# Patient Record
Sex: MALE | Race: ASIAN | Employment: OTHER | URBAN - METROPOLITAN AREA
[De-identification: names, ages, dates, MRNs, and addresses within clinical notes are randomized per-mention and may not be internally consistent; named-entity substitution may affect disease eponyms.]

---

## 2017-04-17 PROBLEM — H91.13 PRESBYCUSIS OF BOTH EARS: Status: ACTIVE | Noted: 2017-04-17

## 2017-04-17 PROBLEM — K58.1 IRRITABLE BOWEL SYNDROME WITH CONSTIPATION: Status: ACTIVE | Noted: 2017-04-17

## 2017-05-30 PROBLEM — M18.11 PRIMARY OSTEOARTHRITIS OF FIRST CARPOMETACARPAL JOINT OF RIGHT HAND: Status: ACTIVE | Noted: 2017-05-30

## 2017-05-30 PROBLEM — M65.311 TRIGGER FINGER OF RIGHT THUMB: Status: ACTIVE | Noted: 2017-05-30

## 2017-06-09 ENCOUNTER — LAB ENCOUNTER (OUTPATIENT)
Dept: LAB | Age: 82
End: 2017-06-09
Attending: INTERNAL MEDICINE
Payer: MEDICARE

## 2017-06-09 DIAGNOSIS — E55.9 VITAMIN D DEFICIENCY: Primary | ICD-10-CM

## 2017-06-09 DIAGNOSIS — I10 HYPERTENSION: ICD-10-CM

## 2017-06-09 DIAGNOSIS — N40.0 BENIGN ENLARGEMENT OF PROSTATE: ICD-10-CM

## 2017-06-09 PROCEDURE — 83970 ASSAY OF PARATHORMONE: CPT

## 2017-06-09 PROCEDURE — 82306 VITAMIN D 25 HYDROXY: CPT

## 2017-06-09 PROCEDURE — 84156 ASSAY OF PROTEIN URINE: CPT

## 2017-06-09 PROCEDURE — 84100 ASSAY OF PHOSPHORUS: CPT

## 2017-06-09 PROCEDURE — 84550 ASSAY OF BLOOD/URIC ACID: CPT

## 2017-06-09 PROCEDURE — 80048 BASIC METABOLIC PNL TOTAL CA: CPT

## 2017-06-09 PROCEDURE — 81003 URINALYSIS AUTO W/O SCOPE: CPT

## 2017-06-09 PROCEDURE — 83735 ASSAY OF MAGNESIUM: CPT

## 2017-06-09 PROCEDURE — 82043 UR ALBUMIN QUANTITATIVE: CPT

## 2017-06-09 PROCEDURE — 82570 ASSAY OF URINE CREATININE: CPT

## 2017-06-09 PROCEDURE — 85025 COMPLETE CBC W/AUTO DIFF WBC: CPT

## 2017-08-23 PROCEDURE — 82746 ASSAY OF FOLIC ACID SERUM: CPT | Performed by: INTERNAL MEDICINE

## 2017-08-23 PROCEDURE — 82607 VITAMIN B-12: CPT | Performed by: INTERNAL MEDICINE

## 2017-10-12 PROBLEM — N40.1 BENIGN PROSTATIC HYPERPLASIA WITH LOWER URINARY TRACT SYMPTOMS, SYMPTOM DETAILS UNSPECIFIED: Status: ACTIVE | Noted: 2017-10-12

## 2017-10-12 PROBLEM — R39.11 URINARY HESITANCY: Status: ACTIVE | Noted: 2017-10-12

## 2017-10-12 PROBLEM — K59.09 CHRONIC CONSTIPATION: Status: ACTIVE | Noted: 2017-10-12

## 2017-10-12 PROBLEM — R35.1 NOCTURIA: Status: ACTIVE | Noted: 2017-10-12

## 2018-02-03 PROCEDURE — 81003 URINALYSIS AUTO W/O SCOPE: CPT | Performed by: INTERNAL MEDICINE

## 2019-04-08 PROBLEM — D69.6 DECREASED PLATELET COUNT (HCC): Status: ACTIVE | Noted: 2019-04-08

## 2019-04-16 PROCEDURE — 81001 URINALYSIS AUTO W/SCOPE: CPT | Performed by: INTERNAL MEDICINE

## 2019-04-16 PROCEDURE — 87086 URINE CULTURE/COLONY COUNT: CPT | Performed by: INTERNAL MEDICINE

## 2019-05-09 PROBLEM — R39.15 BENIGN PROSTATIC HYPERPLASIA (BPH) WITH URINARY URGENCY: Status: ACTIVE | Noted: 2019-05-09

## 2019-05-09 PROBLEM — N40.1 BENIGN PROSTATIC HYPERPLASIA (BPH) WITH URINARY URGENCY: Status: ACTIVE | Noted: 2019-05-09

## 2019-05-09 PROBLEM — N40.1 ENLARGED PROSTATE WITH LOWER URINARY TRACT SYMPTOMS (LUTS): Status: ACTIVE | Noted: 2019-05-09

## 2019-08-30 ENCOUNTER — HOSPITAL (OUTPATIENT)
Dept: OTHER | Age: 84
End: 2019-08-30

## 2019-10-11 PROBLEM — I47.10 PSVT (PAROXYSMAL SUPRAVENTRICULAR TACHYCARDIA) (HCC): Status: ACTIVE | Noted: 2019-10-11

## 2019-10-11 PROBLEM — I47.1 PSVT (PAROXYSMAL SUPRAVENTRICULAR TACHYCARDIA) (HCC): Status: ACTIVE | Noted: 2019-10-11

## 2019-10-11 PROBLEM — I47.10 PSVT (PAROXYSMAL SUPRAVENTRICULAR TACHYCARDIA): Status: ACTIVE | Noted: 2019-10-11

## 2019-10-11 PROBLEM — I49.1 PAC (PREMATURE ATRIAL CONTRACTION): Status: ACTIVE | Noted: 2019-10-11

## 2020-07-20 PROBLEM — F11.20 OPIATE DEPENDENCE (HCC): Status: ACTIVE | Noted: 2020-07-20

## 2020-07-20 PROBLEM — F11.20 UNCOMPLICATED OPIOID DEPENDENCE (HCC): Status: ACTIVE | Noted: 2020-07-20

## 2020-07-20 PROBLEM — I77.9 BILATERAL CAROTID ARTERY DISEASE (HCC): Status: ACTIVE | Noted: 2020-07-20

## 2020-07-20 PROBLEM — F13.20 BENZODIAZEPINE DEPENDENCE (HCC): Status: ACTIVE | Noted: 2020-07-20

## 2020-07-20 PROBLEM — G31.9 BRAIN ATROPHY (HCC): Status: ACTIVE | Noted: 2020-07-20

## 2020-11-23 PROBLEM — I47.10 PSVT (PAROXYSMAL SUPRAVENTRICULAR TACHYCARDIA): Status: RESOLVED | Noted: 2019-10-11 | Resolved: 2020-11-23

## 2020-11-23 PROBLEM — I47.10 PSVT (PAROXYSMAL SUPRAVENTRICULAR TACHYCARDIA) (HCC): Status: RESOLVED | Noted: 2019-10-11 | Resolved: 2020-11-23

## 2020-11-23 PROBLEM — I47.1 PSVT (PAROXYSMAL SUPRAVENTRICULAR TACHYCARDIA) (HCC): Status: RESOLVED | Noted: 2019-10-11 | Resolved: 2020-11-23

## 2020-12-10 PROBLEM — R39.15 BENIGN PROSTATIC HYPERPLASIA (BPH) WITH URINARY URGENCY: Status: RESOLVED | Noted: 2019-05-09 | Resolved: 2020-12-10

## 2020-12-10 PROBLEM — M18.11 PRIMARY OSTEOARTHRITIS OF FIRST CARPOMETACARPAL JOINT OF RIGHT HAND: Status: RESOLVED | Noted: 2017-05-30 | Resolved: 2020-12-10

## 2020-12-10 PROBLEM — N40.1 BENIGN PROSTATIC HYPERPLASIA (BPH) WITH URINARY URGENCY: Status: RESOLVED | Noted: 2019-05-09 | Resolved: 2020-12-10

## 2021-03-04 PROBLEM — R31.29 MICROHEMATURIA: Status: ACTIVE | Noted: 2021-03-04

## 2021-04-12 DIAGNOSIS — Z23 NEED FOR VACCINATION: ICD-10-CM

## 2022-01-03 PROBLEM — I70.0 AORTIC ATHEROSCLEROSIS (HCC): Status: ACTIVE | Noted: 2022-01-03

## 2022-01-03 PROBLEM — I65.23 CAROTID ATHEROSCLEROSIS, BILATERAL: Status: RESOLVED | Noted: 2022-01-03 | Resolved: 2022-01-03

## 2022-01-03 PROBLEM — I77.9 BILATERAL CAROTID ARTERY DISEASE, UNSPECIFIED TYPE (HCC): Status: ACTIVE | Noted: 2022-01-03

## 2022-01-03 PROBLEM — D69.6 THROMBOCYTOPENIA (HCC): Status: ACTIVE | Noted: 2019-04-08

## 2022-01-03 PROBLEM — I47.10 SVT (SUPRAVENTRICULAR TACHYCARDIA) (HCC): Status: ACTIVE | Noted: 2022-01-03

## 2022-01-03 PROBLEM — I65.23 CAROTID ATHEROSCLEROSIS, BILATERAL: Status: ACTIVE | Noted: 2022-01-03

## 2022-01-03 PROBLEM — I47.10 SVT (SUPRAVENTRICULAR TACHYCARDIA): Status: ACTIVE | Noted: 2022-01-03

## 2022-01-03 PROBLEM — R52 TOTAL BODY PAIN: Status: ACTIVE | Noted: 2022-01-03

## 2022-01-03 PROBLEM — K58.1 IRRITABLE BOWEL SYNDROME WITH CONSTIPATION: Status: RESOLVED | Noted: 2017-04-17 | Resolved: 2022-01-03

## 2022-01-03 PROBLEM — I47.1 SVT (SUPRAVENTRICULAR TACHYCARDIA) (HCC): Status: ACTIVE | Noted: 2022-01-03

## 2022-01-03 PROBLEM — F32.A: Status: ACTIVE | Noted: 2022-01-03

## 2022-01-03 PROBLEM — I49.1 PAC (PREMATURE ATRIAL CONTRACTION): Status: RESOLVED | Noted: 2019-10-11 | Resolved: 2022-01-03

## 2022-01-03 PROBLEM — E27.40 HYPOADRENALISM (HCC): Status: ACTIVE | Noted: 2021-03-11

## 2022-01-03 PROBLEM — F03.90: Status: ACTIVE | Noted: 2022-01-03

## 2022-01-03 PROBLEM — F03.93: Status: ACTIVE | Noted: 2022-01-03

## 2022-02-17 PROBLEM — R26.81 GAIT INSTABILITY: Status: ACTIVE | Noted: 2022-02-17

## 2022-02-17 PROBLEM — R29.898 WEAKNESS OF BOTH LOWER EXTREMITIES: Status: ACTIVE | Noted: 2022-02-17

## 2022-07-28 ENCOUNTER — APPOINTMENT (OUTPATIENT)
Dept: GENERAL RADIOLOGY | Facility: HOSPITAL | Age: 87
End: 2022-07-28
Attending: EMERGENCY MEDICINE
Payer: MEDICARE

## 2022-07-28 ENCOUNTER — HOSPITAL ENCOUNTER (EMERGENCY)
Facility: HOSPITAL | Age: 87
Discharge: HOME | End: 2022-07-28
Attending: EMERGENCY MEDICINE
Payer: MEDICARE

## 2022-07-28 VITALS
DIASTOLIC BLOOD PRESSURE: 77 MMHG | TEMPERATURE: 98 F | HEIGHT: 64 IN | WEIGHT: 149.06 LBS | BODY MASS INDEX: 25.45 KG/M2 | RESPIRATION RATE: 16 BRPM | HEART RATE: 67 BPM | OXYGEN SATURATION: 97 % | SYSTOLIC BLOOD PRESSURE: 175 MMHG

## 2022-07-28 DIAGNOSIS — K21.00 GASTROESOPHAGEAL REFLUX DISEASE WITH ESOPHAGITIS, UNSPECIFIED WHETHER HEMORRHAGE: Primary | ICD-10-CM

## 2022-07-28 LAB
ANION GAP SERPL CALC-SCNC: 4 MMOL/L (ref 0–18)
BASOPHILS # BLD AUTO: 0.03 X10(3) UL (ref 0–0.2)
BASOPHILS NFR BLD AUTO: 0.5 %
BUN BLD-MCNC: 10 MG/DL (ref 7–18)
BUN/CREAT SERPL: 8.1 (ref 10–20)
CALCIUM BLD-MCNC: 9.3 MG/DL (ref 8.5–10.1)
CHLORIDE SERPL-SCNC: 106 MMOL/L (ref 98–112)
CO2 SERPL-SCNC: 30 MMOL/L (ref 21–32)
CREAT BLD-MCNC: 1.24 MG/DL
DEPRECATED RDW RBC AUTO: 45.2 FL (ref 35.1–46.3)
EOSINOPHIL # BLD AUTO: 0.23 X10(3) UL (ref 0–0.7)
EOSINOPHIL NFR BLD AUTO: 3.7 %
ERYTHROCYTE [DISTWIDTH] IN BLOOD BY AUTOMATED COUNT: 13.7 % (ref 11–15)
GLUCOSE BLD-MCNC: 104 MG/DL (ref 70–99)
HCT VFR BLD AUTO: 44.8 %
HGB BLD-MCNC: 14.5 G/DL
IMM GRANULOCYTES # BLD AUTO: 0.01 X10(3) UL (ref 0–1)
IMM GRANULOCYTES NFR BLD: 0.2 %
LYMPHOCYTES # BLD AUTO: 2.19 X10(3) UL (ref 1–4)
LYMPHOCYTES NFR BLD AUTO: 35.3 %
MCH RBC QN AUTO: 28.9 PG (ref 26–34)
MCHC RBC AUTO-ENTMCNC: 32.4 G/DL (ref 31–37)
MCV RBC AUTO: 89.4 FL
MONOCYTES # BLD AUTO: 0.47 X10(3) UL (ref 0.1–1)
MONOCYTES NFR BLD AUTO: 7.6 %
NEUTROPHILS # BLD AUTO: 3.28 X10 (3) UL (ref 1.5–7.7)
NEUTROPHILS # BLD AUTO: 3.28 X10(3) UL (ref 1.5–7.7)
NEUTROPHILS NFR BLD AUTO: 52.7 %
OSMOLALITY SERPL CALC.SUM OF ELEC: 289 MOSM/KG (ref 275–295)
PLATELET # BLD AUTO: 151 10(3)UL (ref 150–450)
POTASSIUM SERPL-SCNC: 4 MMOL/L (ref 3.5–5.1)
RBC # BLD AUTO: 5.01 X10(6)UL
SODIUM SERPL-SCNC: 140 MMOL/L (ref 136–145)
TROPONIN I HIGH SENSITIVITY: 14 NG/L
WBC # BLD AUTO: 6.2 X10(3) UL (ref 4–11)

## 2022-07-28 PROCEDURE — 36415 COLL VENOUS BLD VENIPUNCTURE: CPT

## 2022-07-28 PROCEDURE — 80048 BASIC METABOLIC PNL TOTAL CA: CPT | Performed by: EMERGENCY MEDICINE

## 2022-07-28 PROCEDURE — 93010 ELECTROCARDIOGRAM REPORT: CPT | Performed by: EMERGENCY MEDICINE

## 2022-07-28 PROCEDURE — 85025 COMPLETE CBC W/AUTO DIFF WBC: CPT | Performed by: EMERGENCY MEDICINE

## 2022-07-28 PROCEDURE — 93005 ELECTROCARDIOGRAM TRACING: CPT

## 2022-07-28 PROCEDURE — 84484 ASSAY OF TROPONIN QUANT: CPT | Performed by: EMERGENCY MEDICINE

## 2022-07-28 PROCEDURE — 71045 X-RAY EXAM CHEST 1 VIEW: CPT | Performed by: EMERGENCY MEDICINE

## 2022-07-28 PROCEDURE — 99284 EMERGENCY DEPT VISIT MOD MDM: CPT

## 2022-07-28 NOTE — ED QUICK NOTES
Patient discharged home in no acute distress. A&Ox4, skin p/w/d, denies cp/sob. Ambulating with steady gait and verbalized understanding of d/c instructions. Provided wheelchair to ED exit at this time.

## 2022-07-28 NOTE — ED INITIAL ASSESSMENT (HPI)
Pt came in for left sided chest pain started yesterday, pt describes pain as \" Heartburn\"  Pt is A/O x 4, breathing unlabored.

## 2023-11-02 ENCOUNTER — HOSPITAL ENCOUNTER (EMERGENCY)
Facility: HOSPITAL | Age: 88
Discharge: HOME | End: 2023-11-02
Attending: EMERGENCY MEDICINE
Payer: MEDICARE

## 2023-11-02 ENCOUNTER — APPOINTMENT (OUTPATIENT)
Dept: CT IMAGING | Facility: HOSPITAL | Age: 88
End: 2023-11-02
Attending: EMERGENCY MEDICINE
Payer: MEDICARE

## 2023-11-02 VITALS
RESPIRATION RATE: 18 BRPM | WEIGHT: 145 LBS | SYSTOLIC BLOOD PRESSURE: 175 MMHG | BODY MASS INDEX: 25 KG/M2 | TEMPERATURE: 97 F | OXYGEN SATURATION: 98 % | HEART RATE: 66 BPM | DIASTOLIC BLOOD PRESSURE: 79 MMHG
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
## 2023-11-02 DIAGNOSIS — S09.90XA INJURY OF HEAD, INITIAL ENCOUNTER: ICD-10-CM

## 2023-11-02 PROCEDURE — 99284 EMERGENCY DEPT VISIT MOD MDM: CPT

## 2023-11-02 PROCEDURE — 70450 CT HEAD/BRAIN W/O DYE: CPT | Performed by: EMERGENCY MEDICINE

## 2024-03-12 ENCOUNTER — ORDER TRANSCRIPTION (OUTPATIENT)
Dept: PHYSICAL THERAPY | Facility: HOSPITAL | Age: 89
End: 2024-03-12

## 2024-03-12 DIAGNOSIS — I89.0 LYMPHEDEMA: Primary | ICD-10-CM

## 2024-04-27 ENCOUNTER — APPOINTMENT (OUTPATIENT)
Dept: GENERAL RADIOLOGY | Facility: HOSPITAL | Age: 89
End: 2024-04-27
Attending: EMERGENCY MEDICINE
Payer: MEDICARE

## 2024-04-27 ENCOUNTER — APPOINTMENT (OUTPATIENT)
Dept: CT IMAGING | Facility: HOSPITAL | Age: 89
End: 2024-04-27
Attending: EMERGENCY MEDICINE
Payer: MEDICARE

## 2024-04-27 ENCOUNTER — HOSPITAL ENCOUNTER (EMERGENCY)
Facility: HOSPITAL | Age: 89
Discharge: HOME | End: 2024-04-27
Attending: EMERGENCY MEDICINE
Payer: MEDICARE

## 2024-04-27 VITALS
RESPIRATION RATE: 20 BRPM | DIASTOLIC BLOOD PRESSURE: 61 MMHG | WEIGHT: 150 LBS | BODY MASS INDEX: 26.25 KG/M2 | HEART RATE: 63 BPM | HEIGHT: 63.5 IN | TEMPERATURE: 97 F | SYSTOLIC BLOOD PRESSURE: 133 MMHG | OXYGEN SATURATION: 96 %

## 2024-04-27 DIAGNOSIS — S22.32XA CLOSED FRACTURE OF ONE RIB OF LEFT SIDE, INITIAL ENCOUNTER: ICD-10-CM

## 2024-04-27 DIAGNOSIS — S02.2XXA CLOSED FRACTURE OF NASAL BONE, INITIAL ENCOUNTER: ICD-10-CM

## 2024-04-27 DIAGNOSIS — S02.85XA CLOSED FRACTURE OF LEFT ORBIT, INITIAL ENCOUNTER (HCC): Primary | ICD-10-CM

## 2024-04-27 PROCEDURE — 99285 EMERGENCY DEPT VISIT HI MDM: CPT

## 2024-04-27 PROCEDURE — 70450 CT HEAD/BRAIN W/O DYE: CPT | Performed by: EMERGENCY MEDICINE

## 2024-04-27 PROCEDURE — 71101 X-RAY EXAM UNILAT RIBS/CHEST: CPT | Performed by: EMERGENCY MEDICINE

## 2024-04-27 PROCEDURE — 99284 EMERGENCY DEPT VISIT MOD MDM: CPT

## 2024-04-27 RX ORDER — HYDROCODONE BITARTRATE AND ACETAMINOPHEN 5; 325 MG/1; MG/1
1 TABLET ORAL EVERY 6 HOURS PRN
Qty: 16 TABLET | Refills: 0 | Status: SHIPPED | OUTPATIENT
Start: 2024-04-27 | End: 2024-05-04

## 2024-04-27 RX ORDER — IBUPROFEN 600 MG/1
600 TABLET ORAL EVERY 8 HOURS PRN
Qty: 30 TABLET | Refills: 0 | Status: SHIPPED | OUTPATIENT
Start: 2024-04-27 | End: 2024-05-04

## 2024-04-27 NOTE — ED PROVIDER NOTES
Patient Seen in: [facility] Emergency Department    History     Chief Complaint   Patient presents with    Fall       HPI    88-year-old male with past medical history significant for anemia, arthritis, depression, anxiety, GERD, high blood pressure, high cholesterol, SVT, chronic kidney disease, IBS, hypoadrenalism, presents to the ED for evaluation of left facial pain after fall.  Patient tripped on his walker and fell hitting the left side of his body and face.  He complains of pain to his left periorbital area as well as his left chest wall.  No LOC.  No nausea or vomiting.  No vision changes    History from Independent Source:       External Records Reviewed: Reviewed prior records available in EMR and patient is not on any blood thinners    History reviewed.   Past Medical History:    Anemia due to chronic kidney disease    Arthritis    diffuse    BPH    Chronic constipation    on Linzess, from Dr. Flores at [place]    Depression with anxiety    sees Dr. Albert Tyson in [place] for psych, also dx with ocd, last visit 12/21    Eye exam, routine    [facility]    GERD (gastroesophageal reflux disease)    Gluteal tendonitis of left buttock    Dr. Manjit Lee, PT recommended    HTN, goal below 130/80    Hyperaldosteronism (HCC)    saw Dr. Ocoha    Hyperlipidemia LDL goal <130    Hypoadrenalism (HCC)    Dr. Ochoa, orthostatic hypotension better with hydrocortisone    IBS (irritable bowel syndrome)    Living will in place    Lumbar radiculopathy    Dr. Lee    Normal cardiac stress test    EF 54%    Palpitations    Presbyacusis    has hearing aides    Scalp itch    chronic for years, no help with topical/derm eval. benadryl orally works well.     Stage 3a chronic kidney disease (HCC)    SVT (supraventricular tachycardia) (HCC)    sees Dr. Abbasi       History reviewed.   Past Surgical History:   Procedure Laterality Date    Cataract Bilateral     Colonoscopy  01/2009    Dr. De La Garza, no more due to age     Egd  2011    with esophagitis    Knee replacement surgery Bilateral 2004    [facility]    Other surgical history  08    flow/us/cysto-dr. gonzalez    Other surgical history  11    andrés/us-dr. gonzalez    Other surgical history  13    Cysto- Dr. Gonzalez     Other surgical history  2017    cysto, flow, trus dr gonzalez     Tonsillectomy           Medications :  (Not in a hospital admission)       Family History   Problem Relation Age of Onset    Hypertension Father     Cancer Mother         uterus    Diabetes Sister     Hypertension Sister     Heart Disorder Sister     Heart Disorder Brother         stroke    No Known Problems Daughter     Diabetes Son     Obesity Son     Heart Disorder Son         cardiomyopathy, goes to [facility]    Other (cva) Brother 89       Smoking Status:   Social History     Socioeconomic History    Marital status:     Number of children: 2   Occupational History    Occupation: retired neurologist   Tobacco Use    Smoking status: Former     Current packs/day: 0.00     Average packs/day: 0.3 packs/day for 10.0 years (2.5 ttl pk-yrs)     Types: Cigarettes     Start date: 1966     Quit date: 1976     Years since quittin.3    Smokeless tobacco: Never   Vaping Use    Vaping status: Never Used   Substance and Sexual Activity    Alcohol use: Not Currently     Alcohol/week: 0.0 standard drinks of alcohol     Comment: rarely     Drug use: No    Sexual activity: Not Currently     Partners: Female   Other Topics Concern     Service No    Blood Transfusions Yes     Comment: after knee surgery  at [facility]    Exercise Yes     Comment: walking in nice weather 1/3/22    Seat Belt Yes    Self-Exams No     Comment: discussed 17, 19, 12/10/20, 1/3/22       Constitutional and vital signs reviewed.      Social History and Family History elements reviewed from today, pertinent positives to the presenting problem noted.    Physical Exam     ED Triage Vitals   BP 24  1611 127/51   Pulse 04/27/24 1611 61   Resp 04/27/24 1611 20   Temp 04/27/24 1611 97.7 °F (36.5 °C)   Temp src 04/27/24 1751 Oral   SpO2 04/27/24 1611 97 %   O2 Device --        Physical Exam   Constitutional: AAOx3, well nourished, NAD  HEENT: Normocephalic, PERRLA, MMM, gross edema and ecchymosis in the left periorbital region as well as the nasal bridge with tenderness to palpation.  No diplopia  CV: s1s2+, RRR, no m/r/g, normal distal pulses  Pulmonary/Chest: CTA b/l with no rales, wheezes.  Left lateral chest wall tenderness without crepitus  Abdominal: Nontender.  Nondistended. Soft. Bowel sounds are normal.   Neck/Back:   :   Musculoskeletal: Normal range of motion. No deformity.   Neurological: Awake, alert. Normal reflexes. No cranial nerve deficit.    Skin: Skin is warm and dry. No rash noted. No erythema.   Psychiatric:      All measures to prevent infection transmission during my interaction with the patient were taken. The patient was already wearing a droplet mask on my arrival to the room. Personal protective equipment was worn throughout the duration of the exam.      ED Course      Labs Reviewed - No data to display  My Independent Interpretation of EKG (if performed):     Monitor Interpretation:   normal sinus rhythm as interpreted by me.      Imaging Results Available and Reviewed while in ED: XR RIBS WITH CHEST (3 VIEWS), LEFT  (CPT=71101)    Result Date: 4/27/2024  CONCLUSION:   1. Acute, nondisplaced fracture of the anterolateral left 7th rib. 2. No pneumothorax. 3. Redemonstrated enlarged cardiomediastinal silhouette. 4. Minimal bibasilar atelectasis/scarring. 5. Lesser incidental findings described above.     Dictated by (CST): Shantanu Marino MD on 4/27/2024 at 5:36 PM     Finalized by (CST): Shantanu Marino MD on 4/27/2024 at 5:39 PM          CT BRAIN OR HEAD (66766)    Result Date: 4/27/2024  CONCLUSION:   1. No acute intracranial abnormality.  No displaced calvarial fracture. 2. Acute,  comminuted, and mildly displaced fracture involving the left orbital floor.  There are multifocal areas of subcutaneous emphysema involving the left inferior extraconal region. 3. Bilateral nasal bone fractures, which are new when compared to 11/02/2023 and are also likely acute. 4. Large hematoma involving the inferior left frontal scalp and left periorbital region. 5. Mild left maxillary hemosinus. 6. No significant change in the chronic microvascular ischemic changes and global parenchymal volume loss. 7. No significant change in the left greater than right temporomandibular joint degenerative change. 8. Lesser incidental findings described above.    Dictated by (CST): Shantanu Marino MD on 4/27/2024 at 4:34 PM     Finalized by (CST): Shantanu Marino MD on 4/27/2024 at 4:46 PM         ED Medications Administered: Medications - No data to display          MDM     Vitals:    04/27/24 1611 04/27/24 1715 04/27/24 1751 04/27/24 1800   BP: 127/51 (!) 161/69  133/61   Pulse: 61 63  63   Resp: 20 20  20   Temp: 97.7 °F (36.5 °C)  97.2 °F (36.2 °C)    TempSrc:   Oral    SpO2: 97% 98%  96%   Weight: 68 kg      Height: 161.3 cm (5' 3.5\")        *I personally reviewed and interpreted all ED vitals.    Independent Interpretation of Studies: I have independently reviewed patient's left rib series and he has an acute fracture of his left seventh rib.  I have independently reviewed patient's CT scan of the head and agree with radiologist reading of orbital floor fracture nasal bone fracture    Social Determinants of Health:     Procedures:      Differential/MDM/Shared Decision Making: Differential Diagnosis includes fracture, dislocation, intracranial hemorrhage, pneumothorax, others.      The patient already has anemia, depression, anxiety, arthritis, high blood pressure, high cholesterol, SVT, to contribute to the complexity of this ED evaluation.           Patient has acute fractures of the left hip, left orbital floor, and  bilateral nasal bones.  He has no entrapment.  He has normal room air oxygen saturations and normal respiratory effort.  Discussed case with patient and family, they are comfortable with discharge on pain medicine.      Condition upon leaving the department: Stable    Disposition and Plan     Clinical Impression:  1. Closed fracture of left orbit, initial encounter ([facility])    2. Closed fracture of nasal bone, initial encounter    3. Closed fracture of one rib of left side, initial encounter        Disposition:  Discharge    Follow-up:  Serge Gandara MD  [address] [facility] [address]  [phone]    Call in 2 day(s)      [facility] [place]  [address] [facility] [id number] [phone]  Call in 2 day(s)        Medications Prescribed:  Current Discharge Medication List        START taking these medications    Details   HYDROcodone-acetaminophen 5-325 MG Oral Tab Take 1 tablet by mouth every 6 (six) hours as needed for Pain.  Qty: 16 tablet, Refills: 0    Associated Diagnoses: Closed fracture of one rib of left side, initial encounter      ibuprofen 600 MG Oral Tab Take 1 tablet (600 mg total) by mouth every 8 (eight) hours as needed for Pain or Fever.  Qty: 30 tablet, Refills: 0    Associated Diagnoses: Closed fracture of one rib of left side, initial encounter

## 2024-04-27 NOTE — ED INITIAL ASSESSMENT (HPI)
Patient is here with a mechanical fall  20min ago. No loss of consciousness. No blood thinners. Patient complains of left flank pain & headache.. Patient has a swelling above his left eye. Patient denies vision changes. Patient's left eye is red.